# Patient Record
Sex: FEMALE | URBAN - METROPOLITAN AREA
[De-identification: names, ages, dates, MRNs, and addresses within clinical notes are randomized per-mention and may not be internally consistent; named-entity substitution may affect disease eponyms.]

---

## 2020-08-13 ENCOUNTER — OFFICE VISIT (OUTPATIENT)
Dept: URBAN - METROPOLITAN AREA CLINIC 19 | Facility: CLINIC | Age: 70
End: 2020-08-13
Payer: MEDICARE

## 2020-08-13 ENCOUNTER — DASHBOARD ENCOUNTERS (OUTPATIENT)
Age: 70
End: 2020-08-13

## 2020-08-13 DIAGNOSIS — R19.7 DIARRHEA: ICD-10-CM

## 2020-08-13 DIAGNOSIS — R11.2 NAUSEA & VOMITING: ICD-10-CM

## 2020-08-13 PROCEDURE — 99214 OFFICE O/P EST MOD 30 MIN: CPT | Performed by: INTERNAL MEDICINE

## 2020-08-13 RX ORDER — MONTELUKAST SODIUM 10 MG/1
TAKE 1 TABLET (10 MG) BY ORAL ROUTE ONCE DAILY IN THE EVENING TABLET, FILM COATED ORAL 1
Qty: 0 | Refills: 0 | Status: ACTIVE | COMMUNITY
Start: 1900-01-01

## 2020-08-13 RX ORDER — FLECAINIDE ACETATE 50 MG/1
TAKE 1 TABLET (50 MG) BY ORAL ROUTE EVERY 12 HOURS TABLET ORAL 2
Qty: 0 | Refills: 0 | Status: ACTIVE | COMMUNITY
Start: 1900-01-01

## 2020-08-13 RX ORDER — CELECOXIB 200 MG/1
TAKE 1 CAPSULE (200 MG) BY ORAL ROUTE 2 TIMES PER DAY CAPSULE ORAL 2
Qty: 0 | Refills: 0 | Status: ACTIVE | COMMUNITY
Start: 1900-01-01

## 2020-08-13 RX ORDER — DILTIAZEM HYDROCHLORIDE 120 MG/1
TAKE 1 TABLET (120 MG) BY ORAL ROUTE 3 TIMES PER DAY TABLET, COATED ORAL
Qty: 0 | Refills: 0 | Status: ACTIVE | COMMUNITY
Start: 1900-01-01

## 2020-08-13 RX ORDER — LOSARTAN POTASSIUM 100 MG/1
TAKE 1 TABLET (100 MG) BY ORAL ROUTE ONCE DAILY TABLET, FILM COATED ORAL 1
Qty: 0 | Refills: 0 | Status: ACTIVE | COMMUNITY
Start: 1900-01-01

## 2020-08-13 RX ORDER — OMEPRAZOLE 20 MG/1
TAKE 1 CAPSULE (20 MG) BY ORAL ROUTE ONCE DAILY BEFORE A MEAL CAPSULE, DELAYED RELEASE ORAL 1
Qty: 0 | Refills: 0 | Status: ACTIVE | COMMUNITY
Start: 1900-01-01

## 2020-08-13 RX ORDER — ASPIRIN 81 MG/1
TAKE 1 TABLET (81 MG) BY ORAL ROUTE ONCE DAILY TABLET, COATED ORAL 1
Qty: 0 | Refills: 0 | Status: ACTIVE | COMMUNITY
Start: 1900-01-01

## 2020-08-13 RX ORDER — FLUTICASONE PROPIONATE 50 UG/1
SPRAY 1 SPRAY (50 MCG) IN EACH NOSTRIL BY INTRANASAL ROUTE ONCE DAILY SPRAY, METERED NASAL 1
Qty: 1 | Refills: 0 | Status: ACTIVE | COMMUNITY
Start: 1900-01-01

## 2020-08-13 RX ORDER — ONDANSETRON HYDROCHLORIDE 4 MG/1
1 TABLET TABLET, FILM COATED ORAL ONCE A DAY
Qty: 90 TABLET | Refills: 2 | OUTPATIENT
Start: 2020-08-13

## 2020-08-13 RX ORDER — ATORVASTATIN CALCIUM 20 MG/1
TAKE 1 TABLET (20 MG) BY ORAL ROUTE ONCE DAILY TABLET, FILM COATED ORAL 1
Qty: 0 | Refills: 0 | Status: ACTIVE | COMMUNITY
Start: 1900-01-01

## 2020-08-13 RX ORDER — CETIRIZINE HYDROCHLORIDE 10 MG/1
CAPSULE, LIQUID FILLED ORAL
Qty: 0 | Refills: 0 | Status: ACTIVE | COMMUNITY
Start: 1900-01-01

## 2020-08-13 NOTE — PHYSICAL EXAM SKIN:
no rashes, no suspicious lesions, no areas of discoloration, no jaundice present, good turgor, no masses, no tenderness on palpation
Bladder non-tender and non-distended. Urine clear yellow.

## 2020-08-13 NOTE — HPI-TODAY'S VISIT:
She is doing well except for 3 bowel movements a day.  Is soft in the morning.  We discussed adding Imodium half tablet in the morning.  She has on and off nausea.  Discussed Zofran as needed.  We discussed the biopsies from the colonoscopy which did not confirm ulcerative colitis.

## 2023-04-21 ENCOUNTER — RX ONLY (RX ONLY)
Age: 73
End: 2023-04-21

## 2025-04-21 ENCOUNTER — RX ONLY (RX ONLY)
Age: 75
End: 2025-04-21

## 2025-04-22 ENCOUNTER — APPOINTMENT (OUTPATIENT)
Dept: URBAN - METROPOLITAN AREA CLINIC 28 | Facility: CLINIC | Age: 75
Setting detail: DERMATOLOGY
End: 2025-04-22

## 2025-04-22 DIAGNOSIS — D485 NEOPLASM OF UNCERTAIN BEHAVIOR OF SKIN: ICD-10-CM

## 2025-04-22 DIAGNOSIS — Z12.83 ENCOUNTER FOR SCREENING FOR MALIGNANT NEOPLASM OF SKIN: ICD-10-CM

## 2025-04-22 DIAGNOSIS — L57.8 OTHER SKIN CHANGES DUE TO CHRONIC EXPOSURE TO NONIONIZING RADIATION: ICD-10-CM

## 2025-04-22 DIAGNOSIS — Z85.828 PERSONAL HISTORY OF OTHER MALIGNANT NEOPLASM OF SKIN: ICD-10-CM

## 2025-04-22 DIAGNOSIS — L40.0 PSORIASIS VULGARIS: ICD-10-CM | Status: STABLE

## 2025-04-22 DIAGNOSIS — Z87.2 PERSONAL HISTORY OF DISEASES OF THE SKIN AND SUBCUTANEOUS TISSUE: ICD-10-CM

## 2025-04-22 DIAGNOSIS — Z80.8 FAMILY HISTORY OF MALIGNANT NEOPLASM OF OTHER ORGANS OR SYSTEMS: ICD-10-CM

## 2025-04-22 DIAGNOSIS — B00.1 HERPESVIRAL VESICULAR DERMATITIS: ICD-10-CM

## 2025-04-22 PROBLEM — D48.5 NEOPLASM OF UNCERTAIN BEHAVIOR OF SKIN: Status: ACTIVE | Noted: 2025-04-22

## 2025-04-22 PROCEDURE — ? ADDITIONAL NOTES

## 2025-04-22 PROCEDURE — ? PRESCRIPTION

## 2025-04-22 PROCEDURE — 11102 TANGNTL BX SKIN SINGLE LES: CPT

## 2025-04-22 PROCEDURE — ? PRESCRIPTION MEDICATION MANAGEMENT

## 2025-04-22 PROCEDURE — ? COUNSELING

## 2025-04-22 PROCEDURE — ? BIOPSY BY SHAVE METHOD

## 2025-04-22 PROCEDURE — 99214 OFFICE O/P EST MOD 30 MIN: CPT | Mod: 25

## 2025-04-22 RX ORDER — CLOBETASOL PROPIONATE 0.5 MG/G
OINTMENT TOPICAL
Qty: 60 | Refills: 0 | Status: ERX | COMMUNITY
Start: 2025-04-22

## 2025-04-22 RX ORDER — FLUOROURACIL 5 MG/G
CREAM TOPICAL
Qty: 40 | Refills: 0 | Status: ERX | COMMUNITY
Start: 2025-04-22

## 2025-04-22 RX ADMIN — FLUOROURACIL: 5 CREAM TOPICAL at 00:00

## 2025-04-22 RX ADMIN — CLOBETASOL PROPIONATE: 0.5 OINTMENT TOPICAL at 00:00

## 2025-04-22 ASSESSMENT — LOCATION ZONE DERM: LOCATION ZONE: FACE

## 2025-04-22 ASSESSMENT — LOCATION SIMPLE DESCRIPTION DERM: LOCATION SIMPLE: LEFT TEMPLE

## 2025-04-22 ASSESSMENT — LOCATION DETAILED DESCRIPTION DERM: LOCATION DETAILED: LEFT CENTRAL TEMPLE

## 2025-04-22 NOTE — PROCEDURE: PRESCRIPTION MEDICATION MANAGEMENT
Render In Strict Bullet Format?: No
Continue Regimen: clobetasol 0.05 % topical ointment (null)\\nQuantity: 60.0 g  Days Supply: 30\\nSig: Apply a thin film to the affected areas of the hands BID PRN
Detail Level: Zone
Initiate Treatment: fluorouracil 5 % topical cream \\nQuantity: 40.0 g  Days Supply: 30\\nSig: Apply thin film nightly to the affected area above the right eyebrow nightly for 2 weeks. Stop early with severe rash. Make sure to wash hands after each use.
Plan: Follow up in mid July to recheck these areas she is going to start this after her trip in about two weeks

## 2025-04-22 NOTE — HPI: OTHER
Condition:: Skin cancer screening
Please Describe Your Condition:: is an established patient who is being seen for a chief complaint of Skin cancer screening. An established patient of Dr Nesbitt Last seen November 2023.\\nPatient has personal history of squamous cell carcinoma - left arm in 2021- Moh’s treated by Dr Guzman.\\n\\nNo personal history of Melanoma but Family History- Daughter has Melanoma in back.\\n\\nPatient also has personal history of actinic keratoses,Seborrheic keratoses, Psorisis,   and history of HSV.\\n\\nNo concerns today- missed last year due to her knee replacement surgery but wants to get back on track with yearly FBSEs.
Condition:: Psorisis
Please Describe Your Condition:: is an established patient who is being seen for a chief complaint of Psorisis. It is under control.\\nPatient would like a refill of prescription:\\n\\nCLOBETASOL FOR HANDS to use prn\\n\\nAnd something to use when she gets psoriasis patches on the face. She was unable to get Vtama due to the cost. She currently uses otc Hydrocortisone or an otc tar preparation if she gets a scaly pink patch on her face. One patch above her right eyebrow does not go away with these.
Condition:: NUB
Please Describe Your Condition:: The patient has a small bump of the left temple present for several months now that she thought was psoriasis so she's been trying hydrocortisone on it but it isn't going away and today it actually feels like it is getting firmer/elevated to her.

## 2025-04-22 NOTE — PROCEDURE: BIOPSY BY SHAVE METHOD
Detail Level: Detailed
Depth Of Biopsy: dermis
Was A Bandage Applied: Yes
Size Of Lesion In Cm: 0.5
X Size Of Lesion In Cm: 0
Anticipated Plan (Based On Presumed Biopsy Results): Mohs with Megan or Eusebio if basal cell carcinoma.
Biopsy Type: H and E
Biopsy Method: Personna blade
Anesthesia Type: 1% lidocaine with epinephrine
Hemostasis: Electrocautery and Aluminum Chloride
Wound Care: Petrolatum
Dressing: bandage
Destruction After The Procedure: No
Type Of Destruction Used: Curettage
Curettage Text: The wound bed was treated with curettage after the biopsy was performed.
Cryotherapy Text: The wound bed was treated with cryotherapy after the biopsy was performed.
Electrodesiccation Text: The wound bed was treated with electrodesiccation after the biopsy was performed.
Electrodesiccation And Curettage Text: The wound bed was treated with electrodesiccation and curettage after the biopsy was performed.
Silver Nitrate Text: The wound bed was treated with silver nitrate after the biopsy was performed.
Lab: [id number]
Lab Facility: [id number]
Medical Necessity Information: It is in your best interest to select a reason for this procedure from the list below. All of these items fulfill various CMS LCD requirements except the new and changing color options.
Consent: Written consent was obtained and risks were reviewed including but not limited to scarring, infection, bleeding, scabbing, incomplete removal, nerve damage and allergy to anesthesia.
Post-Care Instructions: I reviewed with the patient in detail post-care instructions. Patient is to keep the biopsy site dry overnight, and then apply bacitracin twice daily until healed. Patient may apply hydrogen peroxide soaks to remove any crusting.
Notification Instructions: Patient will be notified of biopsy results. However, patient instructed to call the office if not contacted within 2 weeks.
Billing Type: Third-Party Bill
Information: Selecting Yes will display possible errors in your note based on the variables you have selected. This validation is only offered as a suggestion for you. PLEASE NOTE THAT THE VALIDATION TEXT WILL BE REMOVED WHEN YOU FINALIZE YOUR NOTE. IF YOU WANT TO FAX A PRELIMINARY NOTE YOU WILL NEED TO TOGGLE THIS TO 'NO' IF YOU DO NOT WANT IT IN YOUR FAXED NOTE.

## 2025-04-22 NOTE — PROCEDURE: COUNSELING
Detail Level: Detailed
Patient Specific Counseling (Will Not Stick From Patient To Patient): -\\n\\nMild psoriasis on both palms-will refill Clobetasol Rx to use BID prn. Potent topical steroid nature and use reviewed. 
Patient Specific Counseling (Will Not Stick From Patient To Patient): -\\n\\nActinic keratosis field above the right eyebrow \\n\\nReviewed that we usually will freeze a couple focal Actinic keratosis, but this is a bigger ill-defined Actinic keratosis in an area so we really recommend using a prescription topical cream like the Fluorouracil cream. \\nPatient has never used the prescription Fluorouracil cream before-discussed trying this vs biopsy to rule out early SCC, though it looks c/w AK today. \\n\\nShe agrees with this plan to try FU cream first and recheck for resolution 6 weeks later. May need biopsy if any unusual area persists-she is aware. \\nRecommended to apply to the affected areas above the right brow nightly for two weeks. Reviewed that the goal of this prescription is to create a rash. \\n\\nBut if the rash is severe like blistering and painful before the two week sudeep then you can always stop early. Make sure to wash your hands after each use. \\nAvoid applying any moisturizers or Vaseline on the area while doing the treatment, because that can create a barrier, and the medicine wont get absorbed. \\nOnce you complete the treatment then you can apply the Vaseline to soothe the area if needed.\\n\\nAvoid applying sun screen, make up, moisturizers, metrogel or anything to this area while doing the treatment and right after. You want the rash to really heal up before apply anything since it will most likely get irritated.\\n\\nThis is her first time to use FU. CAll us if any trouble getting or using the Rx, can see her for a quick check if rash seems unusual or concerning to her. \\n\\nThe patient plans to begin FU course mid May, when she is back from a trip to [place]
Skin Checks Recommendations: SSE q month\\nFBSE with derm q year due to her history of squamous cell carcinoma and Actinic keratosis. \\nFamily history of melanoma; daughter
Detail Level: Generalized
Topical Retinoids Recommendations: Begin with otc Gold Wiley with Retinol to arms and legs at HS\\nConsider Rx Tretinoin, discussed
Nicotinamide Supplementation Recommendations: Continue 500mg BID

## 2025-07-01 ENCOUNTER — RX ONLY (RX ONLY)
Age: 75
End: 2025-07-01

## 2025-07-01 RX ORDER — VALACYCLOVIR HYDROCHLORIDE 500 MG/1
TABLET, FILM COATED ORAL
Qty: 6 | Refills: 0 | Status: ERX | COMMUNITY
Start: 2025-07-01

## 2025-07-22 ENCOUNTER — APPOINTMENT (OUTPATIENT)
Dept: URBAN - METROPOLITAN AREA CLINIC 28 | Facility: CLINIC | Age: 75
Setting detail: DERMATOLOGY
End: 2025-07-22

## 2025-07-22 DIAGNOSIS — L57.8 OTHER SKIN CHANGES DUE TO CHRONIC EXPOSURE TO NONIONIZING RADIATION: ICD-10-CM

## 2025-07-22 DIAGNOSIS — Z85.828 PERSONAL HISTORY OF OTHER MALIGNANT NEOPLASM OF SKIN: ICD-10-CM

## 2025-07-22 PROCEDURE — ? COUNSELING

## 2025-07-22 ASSESSMENT — LOCATION SIMPLE DESCRIPTION DERM: LOCATION SIMPLE: LEFT TEMPLE

## 2025-07-22 ASSESSMENT — LOCATION ZONE DERM: LOCATION ZONE: FACE

## 2025-07-22 ASSESSMENT — LOCATION DETAILED DESCRIPTION DERM: LOCATION DETAILED: LEFT CENTRAL TEMPLE

## 2025-07-22 NOTE — PROCEDURE: COUNSELING
Detail Level: Detailed
Patient Specific Counseling (Will Not Stick From Patient To Patient): -\\nBrow area is clear now\\n\\nPrescription Fluorouracil cream was started on the 12-13 of May then used for 2 weeks. Patient has been off the Fluorouracil cream for 9 weeks now. \\nThe area looks great today!\\n\\nRecommended to try an otc topical retinol qHS to qOHS and if she is going well on that then we can step up to a prescription strength tretinoin treatment. \\nPatient agrees with this plan and will call back if this AK treated spot recurs. \\n\\nSPF and hats every day
Topical Retinoids Recommendations: Begin with otc Gold Wiley with Retinol to arms and legs at HS\\nConsider Rx Tretinoin, discussed
Patient Specific Counseling (Will Not Stick From Patient To Patient): --\\n\\nS/P Mohs this past spring\\nhealed well, scar looks good
Nicotinamide Supplementation Recommendations: Continue 500mg BID

## 2025-07-22 NOTE — HPI: OTHER
Condition:: S/P Mohs for BCC left temple
Please Describe Your Condition:: The patient reports she did have Mohs on her left temple BCC and is happy with the way it healed.